# Patient Record
Sex: MALE | Race: BLACK OR AFRICAN AMERICAN | Employment: UNEMPLOYED | URBAN - METROPOLITAN AREA
[De-identification: names, ages, dates, MRNs, and addresses within clinical notes are randomized per-mention and may not be internally consistent; named-entity substitution may affect disease eponyms.]

---

## 2020-12-24 ENCOUNTER — APPOINTMENT (OUTPATIENT)
Dept: GENERAL RADIOLOGY | Age: 25
End: 2020-12-24
Payer: MEDICAID

## 2020-12-24 ENCOUNTER — HOSPITAL ENCOUNTER (EMERGENCY)
Age: 25
Discharge: HOME | End: 2020-12-25
Attending: EMERGENCY MEDICINE
Payer: MEDICAID

## 2020-12-24 VITALS
WEIGHT: 146 LBS | OXYGEN SATURATION: 99 % | RESPIRATION RATE: 21 BRPM | DIASTOLIC BLOOD PRESSURE: 57 MMHG | HEART RATE: 80 BPM | SYSTOLIC BLOOD PRESSURE: 105 MMHG | TEMPERATURE: 99.3 F

## 2020-12-24 LAB
EKG ATRIAL RATE: 73 BPM
EKG P AXIS: 39 DEGREES
EKG P-R INTERVAL: 162 MS
EKG Q-T INTERVAL: 372 MS
EKG QRS DURATION: 88 MS
EKG QTC CALCULATION (BAZETT): 409 MS
EKG R AXIS: 74 DEGREES
EKG T AXIS: 37 DEGREES
EKG VENTRICULAR RATE: 73 BPM

## 2020-12-24 PROCEDURE — 71045 X-RAY EXAM CHEST 1 VIEW: CPT

## 2020-12-24 PROCEDURE — 93005 ELECTROCARDIOGRAM TRACING: CPT

## 2020-12-24 PROCEDURE — 99282 EMERGENCY DEPT VISIT SF MDM: CPT

## 2020-12-24 RX ORDER — NALOXONE HYDROCHLORIDE 4 MG/.1ML
1 SPRAY NASAL PRN
Qty: 1 EACH | Refills: 0 | Status: ON HOLD | OUTPATIENT
Start: 2020-12-24 | End: 2021-01-07 | Stop reason: HOSPADM

## 2020-12-24 ASSESSMENT — ENCOUNTER SYMPTOMS
DIARRHEA: 0
SHORTNESS OF BREATH: 0
NAUSEA: 0
BACK PAIN: 0
ABDOMINAL PAIN: 0
EYE DISCHARGE: 0
CHEST TIGHTNESS: 0
EYE REDNESS: 0
SINUS PAIN: 0
COUGH: 0
COLOR CHANGE: 0
RHINORRHEA: 0
VOMITING: 0

## 2020-12-25 NOTE — ED NOTES
Written and verbal d/c instructions given. Verbalized understanding. 1 script given.  Calling for cab via insurance     [address], [address] [facility]  12/25/20 0151

## 2020-12-25 NOTE — ED PROVIDER NOTES
[facility] ED  EMERGENCY DEPARTMENT ENCOUNTER      Pt Name: Maryjo Brody  MRN: [id number]  [place] 1995  Date of evaluation: 12/24/2020  Provider: Clive Michelle MD    CHIEF COMPLAINT       Chief Complaint   Patient presents with    Drug Overdose         HISTORY OF PRESENT ILLNESS   (Location/Symptom, Timing/Onset, Context/Setting, Quality, Duration, Modifying Factors, Severity)  Note limiting factors. Maryjo Brody is a 30 y.o. male who presents to the emergency department status post OD. Patient was dropped off by friends to the ED. Patient GCS of 15, alert and oriented x3 he is a little sleepy but not falling asleep during my exam.  Patient states that he snorted what he believed was heroin then when he woke up he was here. According to nursing patient was given intranasal Narcan prior to arrival.  Patient denies difficulty breathing. HPI    Nursing Notes were reviewed. REVIEW OF SYSTEMS    (2-9 systems for level 4, 10 or more for level 5)     Review of Systems   Constitutional: Negative for activity change, chills, fatigue and fever. HENT: Negative for congestion, hearing loss, rhinorrhea, sinus pain, sneezing and tinnitus. Eyes: Negative for discharge, redness and visual disturbance. Respiratory: Negative for cough, chest tightness and shortness of breath. Cardiovascular: Negative for chest pain and leg swelling. Gastrointestinal: Negative for abdominal pain, diarrhea, nausea and vomiting. Endocrine: Negative for heat intolerance, polydipsia and polyuria. Genitourinary: Negative for dysuria, flank pain, hematuria and urgency. Musculoskeletal: Negative for back pain, joint swelling and myalgias. Skin: Negative for color change, rash and wound. Allergic/Immunologic: Negative for immunocompromised state. Neurological: Negative for tremors, seizures, syncope, light-headedness and headaches. Hematological: Does not bruise/bleed easily.    Psychiatric/Behavioral: SpO2 Height Weight   124/85 99.3 °F (37.4 °C) Oral 83 10 98 % -- 146 lb (66.2 kg)       Physical Exam  Vitals signs and nursing note reviewed. Constitutional:       Appearance: Normal appearance. He is not ill-appearing, toxic-appearing or diaphoretic. HENT:      Head: Normocephalic. Right Ear: Tympanic membrane and external ear normal.      Left Ear: Tympanic membrane and external ear normal.      Nose: Nose normal.      Mouth/Throat:      Mouth: Mucous membranes are moist.   Eyes:      Extraocular Movements: Extraocular movements intact. Pupils: Pupils are equal, round, and reactive to light. Neck:      Musculoskeletal: Normal range of motion and neck supple. Cardiovascular:      Rate and Rhythm: Normal rate and regular rhythm. Pulses: Normal pulses. Heart sounds: Normal heart sounds. Pulmonary:      Effort: Pulmonary effort is normal.      Breath sounds: Normal breath sounds. Abdominal:      General: Abdomen is flat. Bowel sounds are normal. There is no distension. Palpations: Abdomen is soft. Tenderness: There is no abdominal tenderness. Musculoskeletal: Normal range of motion. General: No swelling, tenderness or signs of injury. Skin:     General: Skin is warm and dry. Capillary Refill: Capillary refill takes less than 2 seconds. Findings: No erythema. Neurological:      General: No focal deficit present. Mental Status: He is alert and oriented to person, place, and time. Psychiatric:         Mood and Affect: Mood normal.         DIAGNOSTIC RESULTS     EKG: All EKG's are interpreted by the Emergency Department Physician who either signs or Co-signs this chart in the absence of a cardiologist.    Normal sinus rhythm no ST segment elevation or depression. There are occasional PACs. Ventricular rate 73 bpm.  No axis deviation. QTC of 409 ms. Otherwise normal EKG.     RADIOLOGY:   Non-plain film images such as CT, Ultrasound and MRI are route as needed for Opioid Reversal, Disp-1 each, R-0Print           Controlled Substances Monitoring:     No flowsheet data found.     (Please note that portions of this note were completed with a voice recognition program.  Efforts were made to edit the dictations but occasionally words are mis-transcribed.)    Analilia Gardner MD (electronically signed)             Sixto Torres PA-C  12/28/20 1535 Vilma Erickson MD  01/12/21 1213

## 2020-12-25 NOTE — ED TRIAGE NOTES
Presented to triage after snorting heroine and overdosing. Friends gave him narcan and dropped him off at the ER. Upon arrival he is drowsy, but answers questions appropriately.

## 2021-01-05 ENCOUNTER — HOSPITAL ENCOUNTER (INPATIENT)
Age: 26
LOS: 1 days | Discharge: HOME | DRG: 751 | End: 2021-01-07
Attending: EMERGENCY MEDICINE | Admitting: PSYCHIATRY & NEUROLOGY
Payer: MEDICAID

## 2021-01-05 DIAGNOSIS — F32.2 CURRENT SEVERE EPISODE OF MAJOR DEPRESSIVE DISORDER WITHOUT PSYCHOTIC FEATURES, UNSPECIFIED WHETHER RECURRENT (HCC): ICD-10-CM

## 2021-01-05 DIAGNOSIS — T14.91XA SUICIDE ATTEMPT (HCC): Primary | ICD-10-CM

## 2021-01-05 DIAGNOSIS — F19.10 DRUG ABUSE (HCC): ICD-10-CM

## 2021-01-05 LAB
ACETAMINOPHEN LEVEL: <5 UG/ML (ref 10–30)
ALBUMIN SERPL-MCNC: 4.6 G/DL (ref 3.5–4.6)
ALP BLD-CCNC: 121 U/L (ref 35–104)
ALT SERPL-CCNC: 25 U/L (ref 0–41)
AMPHETAMINE SCREEN, URINE: NORMAL
ANION GAP SERPL CALCULATED.3IONS-SCNC: 14 MEQ/L (ref 9–15)
AST SERPL-CCNC: 21 U/L (ref 0–40)
BARBITURATE SCREEN URINE: NORMAL
BASOPHILS ABSOLUTE: 0 K/UL (ref 0–0.2)
BASOPHILS RELATIVE PERCENT: 0.4 %
BENZODIAZEPINE SCREEN, URINE: NORMAL
BILIRUB SERPL-MCNC: 0.5 MG/DL (ref 0.2–0.7)
BILIRUBIN URINE: NEGATIVE
BLOOD, URINE: NEGATIVE
BUN BLDV-MCNC: 11 MG/DL (ref 6–20)
CALCIUM SERPL-MCNC: 9.6 MG/DL (ref 8.5–9.9)
CANNABINOID SCREEN URINE: NORMAL
CHLORIDE BLD-SCNC: 104 MEQ/L (ref 95–107)
CHOLESTEROL, TOTAL: 186 MG/DL (ref 0–199)
CLARITY: CLEAR
CO2: 27 MEQ/L (ref 20–31)
COCAINE METABOLITE SCREEN URINE: NORMAL
COLOR: YELLOW
CREAT SERPL-MCNC: 0.81 MG/DL (ref 0.7–1.2)
EKG ATRIAL RATE: 90 BPM
EKG P AXIS: 72 DEGREES
EKG P-R INTERVAL: 146 MS
EKG Q-T INTERVAL: 330 MS
EKG QRS DURATION: 80 MS
EKG QTC CALCULATION (BAZETT): 403 MS
EKG R AXIS: 81 DEGREES
EKG T AXIS: 40 DEGREES
EKG VENTRICULAR RATE: 90 BPM
EOSINOPHILS ABSOLUTE: 0 K/UL (ref 0–0.7)
EOSINOPHILS RELATIVE PERCENT: 0.2 %
ETHANOL PERCENT: NORMAL G/DL
ETHANOL: <10 MG/DL (ref 0–0.08)
GFR AFRICAN AMERICAN: >60
GFR NON-AFRICAN AMERICAN: >60
GLOBULIN: 2.8 G/DL (ref 2.3–3.5)
GLUCOSE BLD-MCNC: 81 MG/DL (ref 70–99)
GLUCOSE URINE: NEGATIVE MG/DL
HCT VFR BLD CALC: 47.9 % (ref 42–52)
HDLC SERPL-MCNC: 62 MG/DL (ref 40–59)
HEMOGLOBIN: 16.2 G/DL (ref 14–18)
KETONES, URINE: ABNORMAL MG/DL
LDL CHOLESTEROL CALCULATED: 110 MG/DL (ref 0–129)
LEUKOCYTE ESTERASE, URINE: NEGATIVE
LYMPHOCYTES ABSOLUTE: 2.7 K/UL (ref 1–4.8)
LYMPHOCYTES RELATIVE PERCENT: 30.7 %
Lab: NORMAL
MCH RBC QN AUTO: 32.6 PG (ref 27–31.3)
MCHC RBC AUTO-ENTMCNC: 33.9 % (ref 33–37)
MCV RBC AUTO: 96.1 FL (ref 80–100)
METHADONE SCREEN, URINE: NORMAL
MONOCYTES ABSOLUTE: 0.2 K/UL (ref 0.2–0.8)
MONOCYTES RELATIVE PERCENT: 2.5 %
NEUTROPHILS ABSOLUTE: 5.8 K/UL (ref 1.4–6.5)
NEUTROPHILS RELATIVE PERCENT: 66.2 %
NITRITE, URINE: NEGATIVE
OPIATE SCREEN URINE: NORMAL
OXYCODONE URINE: NORMAL
PDW BLD-RTO: 15.4 % (ref 11.5–14.5)
PH UA: 5.5 (ref 5–9)
PHENCYCLIDINE SCREEN URINE: NORMAL
PLATELET # BLD: 250 K/UL (ref 130–400)
POTASSIUM REFLEX MAGNESIUM: 3.7 MEQ/L (ref 3.4–4.9)
PROPOXYPHENE SCREEN: NORMAL
PROTEIN UA: NEGATIVE MG/DL
RBC # BLD: 4.98 M/UL (ref 4.7–6.1)
SALICYLATE, SERUM: <0.3 MG/DL (ref 15–30)
SARS-COV-2, NAAT: NOT DETECTED
SODIUM BLD-SCNC: 145 MEQ/L (ref 135–144)
SPECIFIC GRAVITY UA: 1.02 (ref 1–1.03)
TOTAL PROTEIN: 7.4 G/DL (ref 6.3–8)
TRIGL SERPL-MCNC: 70 MG/DL (ref 0–150)
TSH REFLEX: 1.92 UIU/ML (ref 0.44–3.86)
UROBILINOGEN, URINE: 0.2 E.U./DL
WBC # BLD: 8.8 K/UL (ref 4.8–10.8)

## 2021-01-05 PROCEDURE — 80061 LIPID PANEL: CPT

## 2021-01-05 PROCEDURE — 80307 DRUG TEST PRSMV CHEM ANLYZR: CPT

## 2021-01-05 PROCEDURE — G0480 DRUG TEST DEF 1-7 CLASSES: HCPCS

## 2021-01-05 PROCEDURE — 84443 ASSAY THYROID STIM HORMONE: CPT

## 2021-01-05 PROCEDURE — U0002 COVID-19 LAB TEST NON-CDC: HCPCS

## 2021-01-05 PROCEDURE — 93005 ELECTROCARDIOGRAM TRACING: CPT | Performed by: EMERGENCY MEDICINE

## 2021-01-05 PROCEDURE — 99285 EMERGENCY DEPT VISIT HI MDM: CPT

## 2021-01-05 PROCEDURE — 36415 COLL VENOUS BLD VENIPUNCTURE: CPT

## 2021-01-05 PROCEDURE — 81003 URINALYSIS AUTO W/O SCOPE: CPT

## 2021-01-05 PROCEDURE — 80053 COMPREHEN METABOLIC PANEL: CPT

## 2021-01-05 PROCEDURE — 85025 COMPLETE CBC W/AUTO DIFF WBC: CPT

## 2021-01-06 PROBLEM — F32.2 MAJOR DEPRESSIVE DISORDER, SEVERE (HCC): Status: ACTIVE | Noted: 2021-01-06

## 2021-01-06 PROCEDURE — 6370000000 HC RX 637 (ALT 250 FOR IP): Performed by: PSYCHIATRY & NEUROLOGY

## 2021-01-06 PROCEDURE — 99223 1ST HOSP IP/OBS HIGH 75: CPT | Performed by: PSYCHIATRY & NEUROLOGY

## 2021-01-06 PROCEDURE — 1240000000 HC EMOTIONAL WELLNESS R&B

## 2021-01-06 PROCEDURE — G0378 HOSPITAL OBSERVATION PER HR: HCPCS

## 2021-01-06 RX ORDER — MAGNESIUM HYDROXIDE/ALUMINUM HYDROXICE/SIMETHICONE 120; 1200; 1200 MG/30ML; MG/30ML; MG/30ML
30 SUSPENSION ORAL PRN
Status: DISCONTINUED | OUTPATIENT
Start: 2021-01-06 | End: 2021-01-07 | Stop reason: HOSPADM

## 2021-01-06 RX ORDER — HYDROXYZINE PAMOATE 50 MG/1
50 CAPSULE ORAL EVERY 6 HOURS PRN
Status: DISCONTINUED | OUTPATIENT
Start: 2021-01-06 | End: 2021-01-07 | Stop reason: HOSPADM

## 2021-01-06 RX ORDER — ACETAMINOPHEN 325 MG/1
650 TABLET ORAL EVERY 4 HOURS PRN
Status: DISCONTINUED | OUTPATIENT
Start: 2021-01-06 | End: 2021-01-07 | Stop reason: HOSPADM

## 2021-01-06 RX ORDER — HYDROXYZINE HYDROCHLORIDE 50 MG/ML
50 INJECTION, SOLUTION INTRAMUSCULAR EVERY 6 HOURS PRN
Status: DISCONTINUED | OUTPATIENT
Start: 2021-01-06 | End: 2021-01-07 | Stop reason: HOSPADM

## 2021-01-06 RX ORDER — HALOPERIDOL 5 MG
5 TABLET ORAL EVERY 6 HOURS PRN
Status: DISCONTINUED | OUTPATIENT
Start: 2021-01-06 | End: 2021-01-07 | Stop reason: HOSPADM

## 2021-01-06 RX ORDER — BENZTROPINE MESYLATE 1 MG/ML
2 INJECTION INTRAMUSCULAR; INTRAVENOUS 2 TIMES DAILY PRN
Status: DISCONTINUED | OUTPATIENT
Start: 2021-01-06 | End: 2021-01-07 | Stop reason: HOSPADM

## 2021-01-06 RX ORDER — TRAZODONE HYDROCHLORIDE 50 MG/1
50 TABLET ORAL NIGHTLY PRN
Status: DISCONTINUED | OUTPATIENT
Start: 2021-01-06 | End: 2021-01-07 | Stop reason: HOSPADM

## 2021-01-06 RX ORDER — HALOPERIDOL 5 MG/ML
5 INJECTION INTRAMUSCULAR EVERY 6 HOURS PRN
Status: DISCONTINUED | OUTPATIENT
Start: 2021-01-06 | End: 2021-01-07 | Stop reason: HOSPADM

## 2021-01-06 RX ADMIN — HYDROXYZINE PAMOATE 50 MG: 50 CAPSULE ORAL at 21:35

## 2021-01-06 RX ADMIN — TRAZODONE HYDROCHLORIDE 50 MG: 50 TABLET ORAL at 22:47

## 2021-01-06 ASSESSMENT — SLEEP AND FATIGUE QUESTIONNAIRES
DIFFICULTY FALLING ASLEEP: YES
DO YOU USE A SLEEP AID: YES
SLEEP PATTERN: DIFFICULTY FALLING ASLEEP;NIGHTMARES/TERRORS
DO YOU HAVE DIFFICULTY SLEEPING: YES

## 2021-01-06 ASSESSMENT — PATIENT HEALTH QUESTIONNAIRE - PHQ9
SUM OF ALL RESPONSES TO PHQ QUESTIONS 1-9: 21
SUM OF ALL RESPONSES TO PHQ QUESTIONS 1-9: 21

## 2021-01-06 NOTE — H&P
[address] [place] [address] - Department of Psychiatry    History and Physical - Adult     CHIEF COMPLAINT:  Depression SI    History obtained from:  patient    Patient was seen after discussing with the treatment team and reviewing the chart      HISTORY OF PRESENT ILLNESS:      The patient is a 30 y.o. male single, live alone, homeless with significant past history of addiction and depression    Pt was admitted to 1  from [facility]. Was admitted over there a week ago for opiate dependence. Pt was feeling depression and suicidal which he currently minimize    Pt denies any mood swings or anger issues  Pt is focused on going to sober living facility with dual diagnosis approach  Denies any  or     Stressors: The patient is not currently receiving care for the above psychiatric illness. Medications Prior to Admission:   Medications Prior to Admission: naloxone (NARCAN) 4 MG/0.1ML LIQD nasal spray, 1 spray by Nasal route as needed for Opioid Reversal    Compliance:no    Psychiatric Review of Systems       Depression: ues     Sanjuana or Hypomania:  no     Panic Attacks:  no     Phobias:  no     Obsessions and Compulsions:  no     PTSD : no     Hallucinations:  no     Delusions:  no    Substance Abuse History:  ETOH: no   Marijuana: no  Opiates: 3 weeks, not in withdrawal  Other Drugs: no      Past Psychiatric History:  Addiction    Past Medical History:    No past medical history on file. Past Surgical History:    No past surgical history on file. Allergies:   Patient has no known allergies. Family History  No family history on file. Social History:  Born and Raised: [place]  Describes Childhood:   supportive  Education: [employer]  Employment: Unemployed, not seeking work  Relationships: single  Current Support: poor    Legal Hx: none  Access to weapons?:  No      EXAMINATION:    REVIEW OF SYSTEMS:    ROS:  [x] All negative/unchanged except if checked.  Explain positive(checked items) below: [] Constitutional  [] Eyes  [] Ear/Nose/Mouth/Throat  [] Respiratory  [] CV  [] GI  []   [] Musculoskeletal  [] Skin/Breast  [] Neurological  [] Endocrine  [] Heme/Lymph  [] Allergic/Immunologic    Explanation:     Vitals:  /75   Pulse 89   Temp 98.2 °F (36.8 °C) (Oral)   Resp 16   Ht 5' 6\" (1.676 m)   Wt 150 lb (68 kg)   SpO2 97%   BMI 24.21 kg/m²      Neurologic Exam:   Muscle Strength & Tone: full ROM  Gait: normal gait   Involuntary Movements: Yes    Mental Status Examination:    Level of consciousness:  within normal limits   Appearance:  ill-appearing  Behavior/Motor:  no abnormalities noted  Attitude toward examiner:  cooperative  Speech:  normal rate   Mood: anxious  Affect:  blunted  Thought processes:  linear   Thought content:  Suicidal Ideation:  denies suicidal ideation  Cognition:  oriented to person, place, and time   Concentration intact  Memory intact  Insight fair   Judgement poor   Fund of Knowledge adequate    Mini Mental Status 30/30      DIAGNOSIS:     Opiate dependence   SIMD    RISK ASSESSMENT:    SUICIDE RISK ASSESSMENT: moderate  HOMICIDE: low  AGITATION/VIOLENCE: low  ELOPEMENT: low    LABS: REVIEWED TODAY:  Recent Labs     01/05/21 2113   WBC 8.8   HGB 16.2        Recent Labs     01/05/21 2113   *   K 3.7      CO2 27   BUN 11   CREATININE 0.81   GLUCOSE 81     Recent Labs     01/05/21 2113   BILITOT 0.5   ALKPHOS 121*   AST 21   ALT 25     Lab Results   Component Value Date    LABAMPH Neg 01/05/2021    BARBSCNU Neg 01/05/2021    LABBENZ Neg 01/05/2021    LABMETH Neg 01/05/2021    OPIATESCREENURINE Neg 01/05/2021    PHENCYCLIDINESCREENURINE Neg 01/05/2021    ETOH <10 01/05/2021     No results found for: TSH, FREET4  No results found for: LITHIUM  No results found for: VALPROATE, CBMZ  No results found for: LITHIUM, VALPROATE    FURTHER LABS ORDERED :      Radiology   Xr Chest Portable    Result Date: 12/25/2020
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
## 2021-01-06 NOTE — ED NOTES
Pt is resting peacefully in bed, alert to voice. No distress noted.        Elian Allen RN  01/06/21 6548

## 2021-01-06 NOTE — GROUP NOTE
Group Therapy Note    Date: 1/6/2021    Group Start Time: 8950  Group End Time: 1700  Group Topic: Healthy Living/Wellness    [facility]    [facility]        Group Therapy Note    Attendees: 12/17         Patient's Goal:  To learn about healthy coping skills. Notes:  Patient participated in group discussion.     Status After Intervention:  Improved    Participation Level: Interactive    Participation Quality: Appropriate and Attentive      Speech:  normal      Thought Process/Content: Logical      Affective Functioning: Flat      Mood: euthymic      Level of consciousness:  Alert and Attentive      Response to Learning: Able to verbalize current knowledge/experience      Endings: None Reported    Modes of Intervention: Education      Discipline Responsible: [address], [employer]      Signature:  Aster Warren

## 2021-01-06 NOTE — ED NOTES
Phone call placed to Dr. Nanette Ness to discuss pt disposition.  Order received to admit to 430Romeo Noble RN  01/06/21 8610

## 2021-01-06 NOTE — CARE COORDINATION
1/6/2021 @ 1337 - Patient was approached regarding completing the Leisure Assessment. When asked about leisure activities, patient stated he liks to reading and write songs. Patient stated he has limited support from family due to his drug addiction. He stated he has been abusing drugs since he was 13years old. Patient is now 30. He is aware that he needs to gain sobriety for himself and his family. Patient was inpatient at [facility] but left abruptly and impulsively. He presented remorseful as about his recent actions and wants to figure out next steps while he is on [address]. Patient seemed genuine in his efforts to make life better for himself and his children.      Electronically signed by CONNER Smith on 1/6/2021 at 2:02 PM

## 2021-01-06 NOTE — ED TRIAGE NOTES
Patient to ED with c/o suicidal thoughts  Patient states he over dosed this morning and was brought here to [facility].    Patient states he left AMA  Patient states he's been having suicidal thoughts \"for awhile\"  Patient states he has no plan  Patent calm and cooperative

## 2021-01-06 NOTE — ED NOTES
Pt is resting in bed, no distress is noted. Suicide precautions maintained.       Roberta Tanner, HAILEY  01/06/21 5506

## 2021-01-06 NOTE — ED NOTES
Report given to [facility] on [address] [place] [address]  01/06/21 4617

## 2021-01-06 NOTE — ED PROVIDER NOTES
eMERGENCY dEPARTMENT eNCOUnter      [facility]    Chief Complaint   Patient presents with    Suicidal       HPI    Maria Cabezas is a 30 y.o. male who presentsto ED from home  By private car  With complaint of Suicide attempt  Onset just PTA  Patient left AMA from [address] [place], [place] [address]ab and called his friend to get some heroin fentanyl in order to kill himself. Patient had to be given Narcan by his friend because he stopped breathing. Patient has been depressed and no one in the family is talking to him. Patient's girlfriend broke up with him today and he decided to kill himself. PAST MEDICAL HISTORY    No past medical history on file. SURGICAL HISTORY    No past surgical history on file. CURRENT MEDICATIONS    Current Outpatient Rx   Medication Sig Dispense Refill    naloxone (NARCAN) 4 MG/0.1ML LIQD nasal spray 1 spray by Nasal route as needed for Opioid Reversal 1 each 0       ALLERGIES    No Known Allergies    FAMILY HISTORY    No family history on file. SOCIAL HISTORY    Social History     Socioeconomic History    Marital status: Single     Spouse name: Not on file    Number of children: Not on file    Years of education: Not on file    Highest education level: Not on file   Occupational History    Not on file   Social Needs    Financial resource strain: Not on file    Food insecurity     Worry: Not on file     Inability: Not on file    Transportation needs     Medical: Not on file     Non-medical: Not on file   Tobacco Use    Smoking status: Current Every Day Smoker     Packs/day: 1.00   Substance and Sexual Activity    Alcohol use:  Yes    Drug use: Yes     Types: Marijuana     Comment: heroine, pcp    Sexual activity: Not on file   Lifestyle    Physical activity     Days per week: Not on file     Minutes per session: Not on file    Stress: Not on file   Relationships    Social connections     Talks on phone: Not on file     Gets together: Not on file Attends Voodoo service: Not on file     Active member of club or organization: Not on file     Attends meetings of clubs or organizations: Not on file     Relationship status: Not on file    Intimate partner violence     Fear of current or ex partner: Not on file     Emotionally abused: Not on file     Physically abused: Not on file     Forced sexual activity: Not on file   Other Topics Concern    Not on file   Social History Narrative    Not on file       REVIEW OF SYSTEMS    Constitutional:  Denies fever, chills, weight loss or weakness   Eyes:  Denies photophobia or discharge   HENT:  Denies sore throat or ear pain   Respiratory:  Denies cough or shortness of breath   Cardiovascular:  Denies chest pain, palpitations or swelling   GI:  Denies abdominal pain, nausea, vomiting, or diarrhea   Musculoskeletal:  Denies back pain   Skin:  Denies rash   Neurologic:  Denies headache, focal weakness or sensory changes   Endocrine:  Denies polyuria or polydypsia   Lymphatic:  Denies swollen glands   Psychiatric: Complains of depression, and suicidal ideation but denies homicidal ideation   All systems negative except as marked. PHYSICAL EXAM    VITAL SIGNS: /79   Pulse 99   Temp 98.2 °F (36.8 °C) (Oral)   Resp 18   Ht 5' 6\" (1.676 m)   Wt 150 lb (68 kg)   SpO2 99%   BMI 24.21 kg/m²    Constitutional:  Well developed, Well nourished, No acute distress, Non-toxic appearance. HENT:  Normocephalic, Atraumatic, Bilateral external ears normal, Oropharynx moist, No oral exudates, Nose normal. Neck- Normal range of motion, No tenderness, Supple, No stridor. Eyes:  PERRL, EOMI, Conjunctiva normal, No discharge. Respiratory:  Normal breath sounds, No respiratory distress, No wheezing, No chest tenderness. Cardiovascular:  Normal heart rate, Normal rhythm, No murmurs, No rubs, No gallops. GI:  Bowel sounds normal, Soft, No tenderness, No masses, No pulsatile masses. :  No CVA tenderness. Musculoskeletal:  Intact distal pulses, No edema, No tenderness, No cyanosis, No clubbing. Good range of motion in all major joints. No tenderness to palpation or major deformities noted. Back- No tenderness. Integument:  Warm, Dry, No erythema, No rash. Lymphatic:  No lymphadenopathy noted. Neurologic:  Alert & oriented x 3, Normal motor function, Normal sensory function, No focal deficits noted. Psychiatric:  Affect depressed, Judgment poor, Mood normal.     EKG    NSR, HR 90 , Normal Axis, No ST  changes, QTc 403, tall T waves    RADIOLOGY    No orders to display       REEVALUATION   Patient was updated the results of labs. Patient is medically cleared for placement    Labs  Labs Reviewed   COMPREHENSIVE METABOLIC PANEL W/ REFLEX TO MG FOR LOW K - Abnormal; Notable for the following components:       Result Value    Sodium 145 (*)     Alkaline Phosphatase 121 (*)     All other components within normal limits   URINALYSIS - Abnormal; Notable for the following components:    Ketones, Urine TRACE (*)     All other components within normal limits   CBC WITH AUTO DIFFERENTIAL - Abnormal; Notable for the following components:    MCH 32.6 (*)     RDW 15.4 (*)     All other components within normal limits   LIPID PANEL - Abnormal; Notable for the following components:    HDL 62 (*)     All other components within normal limits   ETHANOL   URINE DRUG SCREEN   COVID-19   ACETAMINOPHEN LEVEL   SALICYLATE LEVEL   TSH WITH REFLEX             Summation      Patient Course:     ED Medications administered this visit:  Medications - No data to display    New Prescriptions from this visit:    New Prescriptions    No medications on file       Follow-up:  No follow-up provider specified. Final Impression:   1. Suicide attempt ([facility] [address]    2.  Drug abuse ([facility] [address] (Please note that portions of this note were completed with a voice recognition program.  Efforts were made to edit the dictations but occasionally words are mis-transcribed.)          Sunny Guevara MD  01/06/21 0522

## 2021-01-06 NOTE — SUICIDE SAFETY PLAN
SAFETY PLAN    A suicide Safety Plan is a document that supports someone when they are having thoughts of suicide. Warning Signs that indicate a suicidal crisis may be developing: What (situations, thoughts, feelings, body sensations, behaviors, etc.) do you experience that lets you know you are beginning to think about suicide? 1. Feeling suicidal  2. Severe depression  3. Negative thoughts    Internal Coping Strategies:  What things can I do (relaxation techniques, hobbies, physical activities, etc.) to take my mind off my problems without contacting another person? 1. Reading   2. Writing  3. Music    People and social settings that provide distraction: Who can I call or where can I go to distract me? 1. Name: Cyndra Seip.  Phone: unsure  2. Name: Elton Maya  Phone: unsure   3. Place: Visiting with family            4. Place: Spending time with his children    People whom I can ask for help: Who can I call when I need help - for example, friends, family, clergy, someone else? 1. Name: Cyndra Seip.               Phone: unsure  2. Name: Elton Maya  Phone: unsure    Professionals or [facility] agencies I can contact during a crisis: Who can I call for help - for example, my doctor, my psychiatrist, my psychologist, a mental health provider, a suicide hotline? 1. Clinician Name: None. Phone: None. Clinician Pager or Emergency Contact #: None. 2. Suicide Prevention Lifeline: 0-400-921-TALK (3086)    3. [address] [place] [address] Emergency Services -  for example, [facility] suicide hotline, [facility]: 734      Emergency Services Address: [address] [place] [address].      Emergency Services Phone: [phone]    Making the environment safe: How can I make my environment (house/apartment/living space) safer? For example, can I remove guns, medications, and other items? 1. Patient is homeless and would feel safe having a place to live. 2. Patient stated he would also feel safe in a treatment program for drug addiction recovery.

## 2021-01-06 NOTE — CARE COORDINATION
Brief Intervention and Referral to Treatment Summary    Patient was provided PHQ-9, AUDIT and DAST Screening:      PHQ-9 Score: 21  AUDIT Score: 0  DAST Score: 10    Patients substance use is considered     Low Risk/Healthy  Moderate Risk  Harmful  Dependent x    Patients depression is considered:     Minimal  Mild   Moderate  Moderately Severe  Severe x    Brief Education Was Provided    Patient was receptive x  Patient was not receptive      Brief Intervention Is Provided (Only for AUDIT or DAST)     Patient reports readiness to decrease and/or stop use and a plan was discussed x  Patient denies readiness to decrease and/or stop use and a plan was not discussed      Recommendations/Referrals for Brief and/or Specialized Treatment Provided to Patient   Patient stated he was recently inpatient at [facility]. He left abruptly and can return in 7 days. Patient is willing to remain on [address] until he can return to inpatient treatment.      Electronically signed by Marita Guerrero on 1/6/2021 at 2:07 PM

## 2021-01-06 NOTE — PROGRESS NOTES
Arrived on unit via wheelchair at [facility]. Pt declined tour of unit,tour of room given. This writer and Kian Roblero rn completed skin and contraband check and both negative. Pt very sleepy,declined to complete admission process. Pt denies current SI,HI,hallucinations. Contracts for safety on the unit. Unit paperwork given with hipaa code and Pt Rights with explanation.

## 2021-01-06 NOTE — GROUP NOTE
Group Therapy Note    Date: 1/6/2021    Group Start Time: 0900  Group End Time: 0920  Group Topic: Community Meeting    CONNER Alvarez        Group Therapy Note    Attendees: 10         Patient's Goal:  To participate in morning meeting. Notes:  Patient participated in discussion and exercise. Status After Intervention:  Unchanged    Participation Level: Active Listener    Participation Quality: Appropriate      Speech:  normal      Thought Process/Content: Logical      Affective Functioning: Congruent      Mood: anxious      Level of consciousness:  Alert      Response to Learning: Able to verbalize current knowledge/experience      Endings: None Reported    Modes of Intervention: Education      Discipline Responsible: /Counselor      Signature:   CONNER Banda
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
## 2021-01-06 NOTE — CARE COORDINATION
BHI Biopsychosocial Assessment    Current Level of Psychosocial Functioning     Independent   Dependent x  Minimal Assist     Comments:  Patient is currently homeless. He is employed and used to own a home. Patient sold his home and used most of the proceeds to purchase illegal drugs. Patient recently  from his partner and his child are wards of the [facility]. Psychosocial High Risk Factors (check all that apply)    Unable to obtain meds   Chronic illness/pain    Substance abuse x  Lack of Family Support x  Financial stress x  Isolation x  Inadequate Community Resources x  Suicide attempt(s) x  Not taking medications x  Victim of crime   Developmental Delay  Unable to manage personal needs    Age 72 or older   Homeless x  No transportation   Readmission within 30 days  Unemployment   Traumatic Event    Comments:  Patient has at least eight high risk factors associated with this admission. Psychiatric Advanced Directives: None Reported. Family to Involve in Treatment: Patient stated he has no one for staff to contact on his behalf. Sexual Orientation:   Patient is in neither a hetero or homosexual relationship at this time. Patient Strengths: Patient is motivated to overcome his drug addiction. Patient Barriers: None Identified. Opiate Education Provided:  Patient completed brief education and intervention as indicated by his SBIRT Score. CMHC/mental health history:    Plan of Care   medication management, group/individual therapies, family meetings, psycho -education, treatment team meetings to assist with stabilization    Initial Discharge Plan:  Patient has plans to return to [facility] in 7 days.        Clinical Summary: Patient is a 30year old male who was admitted to the [facility] due to making a suicide attempt. Reportedly, patient ingested drugs during the attempt and was given Narcan by a friend. When interviewed, patient presented very remorseful about his actions. He feels he let his children and family down. Patient wants to get clean and sober to make amends for all the struggles he has put his family through. Patient presented as honest and genuine about his effort to get his addiction and mental health under control. Patient stated he has not had a mental health intervention prior to his time at [facility] and coming here to [facility]. By the end of interview, patient presented as upbeat and indicated he is very committed to getting the help he needs.      Electronically signed by CONNER Farrar on 1/6/2021 at 2:19 PM

## 2021-01-06 NOTE — CONSULTS
[facility] MEDICINE    HISTORY AND PHYSICAL EXAM    PATIENT NAME:  Jonas Oreilly    MRN:  [id number]  SERVICE DATE:  1/6/2021   SERVICE TIME:  9:10 AM    Primary Care Physician: No primary care provider on file. SUBJECTIVE  CHIEF COMPLAINT:  Medically appropriate for inpatient psychiatry admission. Consult for medical H/P encounter. HPI:  This is a 30 y.o. male who presents with PMHx substance abuse (heroin and fentanyl), drug overdose presented to emergency room with  Planned suicide attempt after girlfriend broke up with him. He called his friend to get home some heroin fentanyl. He stopped breathing and was given narcan. He left rehab AMA. He was brought to ED by private vehicle. Patient cleared from emergency room for psychiatric care. Patient Seen, Chart, Labs, Radiologystudies, and Consults reviewed. Patient denies headache, chest pain, shortness of breath, N/V/D/C, fever/chills. PAST MEDICAL HISTORY:  No past medical history on file. PAST SURGICAL HISTORY:  No past surgical history on file. FAMILY HISTORY:  No family history on file. SOCIAL HISTORY:    Social History     Socioeconomic History    Marital status: Single     Spouse name: Not on file    Number of children: Not on file    Years of education: Not on file    Highest education level: Not on file   Occupational History    Not on file   Social Needs    Financial resource strain: Not on file    Food insecurity     Worry: Not on file     Inability: Not on file    Transportation needs     Medical: Not on file     Non-medical: Not on file   Tobacco Use    Smoking status: Current Every Day Smoker     Packs/day: 1.00   Substance and Sexual Activity    Alcohol use:  Yes    Drug use: Yes     Types: Marijuana     Comment: michaela, pcp    Sexual activity: Not on file   Lifestyle    Physical activity     Days per week: Not on file     Minutes per session: Not on file    Stress: Not on file   Relationships  Social connections     Talks on phone: Not on file     Gets together: Not on file     Attends Yazidism service: Not on file     Active member of club or organization: Not on file     Attends meetings of clubs or organizations: Not on file     Relationship status: Not on file    Intimate partner violence     Fear of current or ex partner: Not on file     Emotionally abused: Not on file     Physically abused: Not on file     Forced sexual activity: Not on file   Other Topics Concern    Not on file   Social History Narrative    Not on file     MEDICATIONS:    Current Facility-Administered Medications   Medication Dose Route Frequency Provider Last Rate Last Admin    acetaminophen (TYLENOL) tablet 650 mg  650 mg Oral Q4H PRN Elsy Harris MD        magnesium hydroxide (MILK OF MAGNESIA) 400 MG/5ML suspension 30 mL  30 mL Oral Daily PRN Elsy Harris MD        aluminum & magnesium hydroxide-simethicone (MAALOX) 200-200-20 MG/5ML suspension 30 mL  30 mL Oral PRN Elsy Harris MD        haloperidol lactate (HALDOL) injection 5 mg  5 mg Intramuscular Q6H PRN Elsy Harris MD        Or    haloperidol (HALDOL) tablet 5 mg  5 mg Oral Q6H PRN Elsy Harris MD        traZODone (DESYREL) tablet 50 mg  50 mg Oral Nightly PRN Elsy Harris MD        benztropine mesylate (COGENTIN) injection 2 mg  2 mg Intramuscular BID PRN Elsy Harris MD        hydrOXYzine (VISTARIL) capsule 50 mg  50 mg Oral Q6H PRN Elsy Harris MD        Or    hydrOXYzine (VISTARIL) injection 50 mg  50 mg Intramuscular Q6H PRN Elsy Harris MD           ALLERGIES: Patient has no known allergies. REVIEW OF SYSTEM:   ROS as noted in HPI, 12 point ROS reviewed and otherwise negative.     OBJECTIVE  PHYSICAL EXAM: /75   Pulse 89   Temp 98.2 °F (36.8 °C) (Oral)   Resp 16   Ht 5' 6\" (1.676 m)   Wt 150 lb (68 kg)   SpO2 97%   BMI 24.21 kg/m² This is only a history and physical examination and not medical management. The patient is to contact and follow up with their primary care physician and go over any abnormal labs, imaging, findings, medical concerns, or conditions that we have and have not addressed during this encounter.     Plan of care discussed with: patient    SIGNATURE: ZEFERINO Ye NP  DATE: January 6, 2021  TIME: 9:10 AM
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
## 2021-01-07 VITALS
HEART RATE: 93 BPM | DIASTOLIC BLOOD PRESSURE: 64 MMHG | RESPIRATION RATE: 18 BRPM | SYSTOLIC BLOOD PRESSURE: 101 MMHG | OXYGEN SATURATION: 100 % | BODY MASS INDEX: 24.11 KG/M2 | TEMPERATURE: 97 F | WEIGHT: 150 LBS | HEIGHT: 66 IN

## 2021-01-07 PROBLEM — F11.20 UNCOMPLICATED OPIOID DEPENDENCE (HCC): Status: ACTIVE | Noted: 2021-01-07

## 2021-01-07 PROBLEM — F19.94 SUBSTANCE INDUCED MOOD DISORDER (HCC): Status: ACTIVE | Noted: 2021-01-07

## 2021-01-07 PROCEDURE — 99239 HOSP IP/OBS DSCHRG MGMT >30: CPT | Performed by: PSYCHIATRY & NEUROLOGY

## 2021-01-07 PROCEDURE — 93010 ELECTROCARDIOGRAM REPORT: CPT | Performed by: INTERNAL MEDICINE

## 2021-01-07 PROCEDURE — G0378 HOSPITAL OBSERVATION PER HR: HCPCS

## 2021-01-07 NOTE — DISCHARGE INSTR - DIET
? Good nutrition is important when healing from an illness, injury, or surgery. Follow any nutrition recommendations given to you during your hospital stay. ? If you were given an oral nutrition supplement while in the hospital, continue to take this supplement at home. You can take it with meals, in-between meals, and/or before bedtime. These supplements can be purchased at most local grocery stores, pharmacies, and chain [employer]-stores. ? If you have any questions about your diet or nutrition, call the hospital and ask for the dietitian.   As tolerated

## 2021-01-07 NOTE — DISCHARGE SUMMARY
DISCHARGE SUMMARY      Patient ID:  Michelle Woods  [id number]  54 y.o.  1995      Admit date: 1/5/2021    Discharge date and time: 1/7/2021    Admitting Physician: Kishore Cameron MD     Discharge Physician: Dr Renae Krishna MD    Admission Diagnoses: Major depressive disorder, severe ([facility] [address] [F32.2]    Admission Condition: poor    Discharged Condition: stable    Admission Circumstance: The patient is a 30 y.o. male single, live alone, homeless with significant past history of addiction and depression     Pt was admitted to [facility] from [facility]. Was admitted over there a week ago for opiate dependence. Pt was feeling depression and suicidal which he currently minimize     Pt denies any mood swings or anger issues  Pt is focused on going to sober living facility with dual diagnosis approach  Denies any AH or VH      PAST MEDICAL/PSYCHIATRIC HISTORY:   No past medical history on file. FAMILY/SOCIAL HISTORY:  No family history on file. Social History     Socioeconomic History    Marital status: Single     Spouse name: Not on file    Number of children: Not on file    Years of education: Not on file    Highest education level: Not on file   Occupational History    Not on file   Social Needs    Financial resource strain: Not on file    Food insecurity     Worry: Not on file     Inability: Not on file    Transportation needs     Medical: Not on file     Non-medical: Not on file   Tobacco Use    Smoking status: Current Every Day Smoker     Packs/day: 1.00   Substance and Sexual Activity    Alcohol use:  Yes    Drug use: Yes     Types: Marijuana     Comment: heroine, pcp    Sexual activity: Not on file   Lifestyle    Physical activity     Days per week: Not on file     Minutes per session: Not on file    Stress: Not on file   Relationships    Social connections     Talks on phone: Not on file     Gets together: Not on file     Attends Mu-ism service: Not on file Active member of club or organization: Not on file     Attends meetings of clubs or organizations: Not on file     Relationship status: Not on file    Intimate partner violence     Fear of current or ex partner: Not on file     Emotionally abused: Not on file     Physically abused: Not on file     Forced sexual activity: Not on file   Other Topics Concern    Not on file   Social History Narrative    Not on file       MEDICATIONS:    Current Facility-Administered Medications:     acetaminophen (TYLENOL) tablet 650 mg, 650 mg, Oral, Q4H PRN, Harshil Brooke MD    magnesium hydroxide (MILK OF MAGNESIA) 400 MG/5ML suspension 30 mL, 30 mL, Oral, Daily PRN, Harshil Brooke MD    aluminum & magnesium hydroxide-simethicone (MAALOX) 200-200-20 MG/5ML suspension 30 mL, 30 mL, Oral, PRN, Harshil Brooke MD    haloperidol lactate (HALDOL) injection 5 mg, 5 mg, Intramuscular, Q6H PRN **OR** haloperidol (HALDOL) tablet 5 mg, 5 mg, Oral, Q6H PRN, Harshil Brooke MD    traZODone (DESYREL) tablet 50 mg, 50 mg, Oral, Nightly PRN, Harshil Brooke MD, 50 mg at 01/06/21 2247    benztropine mesylate (COGENTIN) injection 2 mg, 2 mg, Intramuscular, BID PRN, Harshil Brooke MD    hydrOXYzine (VISTARIL) capsule 50 mg, 50 mg, Oral, Q6H PRN, 50 mg at 01/06/21 2135 **OR** hydrOXYzine (VISTARIL) injection 50 mg, 50 mg, Intramuscular, Q6H PRN, Harshil Brooke MD    Examination:  /64   Pulse 93   Temp 97 °F (36.1 °C) (Oral)   Resp 18   Ht 5' 6\" (1.676 m)   Wt 150 lb (68 kg)   SpO2 100%   BMI 24.21 kg/m²   Gait - steady    HOSPITAL COURSE[de-identified]  Following admission to the hospital, patient had a complete physical exam and blood work up  Patient was monitored closely with suicide precaution  Patient was not started on any medication  Was encouraged to participate in group and other milieu activity  Patient started to feel better with this combination of treatment. Significant progress in the symptoms since admission. Mood better, with the score of 2/10 - bad  No AVH or paranoid thoughts  No Hopeless or worthless feeling  No active SI/HI  Appetite:  [x] Normal  [] Increased  [] Decreased    Sleep:       [x] Normal  [] Fair       [] Poor            Energy:    [x] Normal  [] Increased  [] Decreased     SI [] Present  [x] Absent  HI  []Present  [x] Absent   Aggression:  [] yes  [] no  Patient is [x] able  [] unable to CONTRACT FOR SAFETY   Medication side effects(SE):  [x] None(Psych. Meds.) [] Other      Mental Status Examination on discharge:    Level of consciousness:  within normal limits   Appearance:  well-appearing  Behavior/Motor:  no abnormalities noted  Attitude toward examiner:  attentive and good eye contact  Speech:  spontaneous, normal rate and normal volume   Mood: euthymic  Affect:  mood congruent  Thought processes:  linear   Thought content:  Suicidal Ideation:  denies suicidal ideation  Delusions:  no evidence of delusions  Perceptual Disturbance:  denies any perceptual disturbance  Cognition:  oriented to person, place, and time   Concentration intact  Memory intact  Insight good   Judgement fair   Fund of Knowledge adequate      ASSESSMENT:  Patient symptoms are:  [x] Well controlled  [x] Improving  [] Worsening  [] No change      Diagnosis:  Principal Problem:    Substance induced mood disorder ([facility] [address]  Active Problems:    Uncomplicated opioid dependence ([facility] [address]  Resolved Problems:    * No resolved hospital problems.  *      LABS:    Recent Labs     01/05/21 2113   WBC 8.8   HGB 16.2        Recent Labs     01/05/21 2113   *   K 3.7      CO2 27   BUN 11   CREATININE 0.81   GLUCOSE 81     Recent Labs     01/05/21 2113   BILITOT 0.5   ALKPHOS 121*   AST 21   ALT 25     Lab Results   Component Value Date    LABAMPH Neg 01/05/2021    BARBSCNU Neg 01/05/2021    LABBENZ Neg 01/05/2021    LABMETH Neg 01/05/2021 OPIATESCREENURINE Neg 01/05/2021    PHENCYCLIDINESCREENURINE Neg 01/05/2021    ETOH <10 01/05/2021     No results found for: TSH, FREET4  No results found for: LITHIUM  No results found for: VALPROATE, CBMZ    RISK ASSESSMENT AT DISCHARGE: Low risk for suicide and homicide. Treatment Plan:  Reviewed current Medications with the patient. Education provided on the complaince with treatment. Risks, benefits, side effects, drug-to-drug interactions and alternatives to treatment were discussed. Encourage patient to attend outpatient follow up appointment and therapy. Patient was advised to call the outpatient provider, visit the nearest ED or call 911 if symptoms are not manageable. Patient's family member was contacted prior to the discharge.          Medication List      STOP taking these medications    Narcan 4 MG/0.1ML Liqd nasal spray  Generic drug: naloxone              Reason for more than one antipsychotic:   [x] N/A  [] 3 failed monotherapy(drugs tried):  [] Cross over to a new antipsychotic  [] Taper to monotherapy from polypharmacy  [] Augmentation of Clozapine therapy due to treatment resistance to single therapy        TIME SPEND - 35 MINUTES TO COMPLETE THE EVALUATION, DISCHARGE SUMMARY, MEDICATION RECONCILIATION AND FOLLOW UP CARE     SignedBrettair Plush  1/7/2021  9:46 AM

## 2021-01-07 NOTE — GROUP NOTE
Group Therapy Note    Date: 1/6/2021    Group Start Time: 3361  Group End Time: 1900  Group Topic: Recreational    [facility]    Zay Velazquez        Group Therapy Note    Attendees: 13/17         Patient's Goal:  To play game of Heads Up with the group. Notes:  Patient played the game with peers.     Status After Intervention:  Improved    Participation Level: Interactive    Participation Quality: Appropriate and Attentive      Speech:  normal      Thought Process/Content: Logical      Affective Functioning: Flat      Mood: euthymic      Level of consciousness:  Alert and Attentive      Response to Learning: Progressing to goal      Endings: None Reported    Modes of Intervention: Activity      Discipline Responsible: Tribzi      Signature:  Zay Velazquez

## 2021-01-07 NOTE — PROGRESS NOTES
Pt noted up on the unit talking on the phone this am, pt appears sleepy, stated he is ok. Pt denies all.

## 2021-01-07 NOTE — GROUP NOTE
Group Therapy Note    Date: 1/7/2021    Group Start Time: 0120  Group End Time: 0210  Group Topic: Cognitive Skills    [facility]    MARVIN Sun        Group Therapy Note    Attendees: 9         Patient's Goal:  Identify triggers for anger    Notes:  Often needed redirected     Status After Intervention:  Unchanged    Participation Level:  Active Listener    Participation Quality: Attentive      Speech:  normal      Thought Process/Content: Logical      Affective Functioning: Flat      Mood: depressed      Level of consciousness:  Alert      Response to Learning: Progressing to goal      Endings: None Reported    Modes of Intervention: Education      Discipline Responsible: /Counselor      Signature:  MARVIN Sun

## 2021-01-07 NOTE — PROGRESS NOTES
Pt. attended the 0900 community meeting. Electronically signed by David Ruiz [address] on 1/7/2021 at 9:47 AM

## 2021-01-07 NOTE — GROUP NOTE
Group Therapy Note    Date: 1/7/2021    Group Start Time: 1000  Group End Time: 1050  Group Topic: Psychoeducation    [facility]    Merle Rothman        Group Therapy Note    Attendees: 7         Patient's Goal:  \"To attend the groups\"    Notes:  Patient was calmer, focus fairly and work independently on his task in group. Status After Intervention:  Unchanged    Participation Level:  Active Listener    Participation Quality: Appropriate      Speech:  quiet      Thought Process/Content: Linear      Affective Functioning: Congruent      Mood: calm      Level of consciousness:  Alert      Response to Learning: Progressing to goal      Endings: None Reported    Modes of Intervention: Education, Socialization and Activity      Discipline Responsible: Psychoeducational Specialist      Signature:  Merle Rothman

## 2021-01-07 NOTE — PROGRESS NOTES
Pt left unit with staff, escorted to lobby and collected by family for ride home. Belongings given to pt. Pt denies any current suicidal ideation, homicidal ideation or hallucinations. Mood and affect stable.

## 2021-01-07 NOTE — GROUP NOTE
Group Therapy Note    Date: 1/7/2021    Group Start Time: 1110  Group End Time: 3625  Group Topic: Psychotherapy    [facility] BRIAN Delarosa [employer]        Group Therapy Note    Attendees: 7         Patient's Goal:  To be discharged today    Notes:  Patient is working on a ride to pick him up    Status After Intervention:  Improved    Participation Level: Interactive    Participation Quality: Appropriate      Speech:  normal      Thought Process/Content: Logical      Affective Functioning: Congruent      Mood: anxious      Level of consciousness:  Alert      Response to Learning: Progressing to goal      Endings: None Reported    Modes of Intervention: Support      Discipline Responsible: /Counselor      Signature:  Iman Delarosa, [employer]

## 2021-01-07 NOTE — PROGRESS NOTES
Discharge instructions reviewed verbally and in writing including f/u appointments. Patient verbalizes understanding and signed as such. All belongings returned for discharge. Patient denies SI, HI, A/V hallucinations, mood is stable. Pt verbalized he has a ride to home he will call them and give an estimated time of DC.
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
## 2021-01-11 NOTE — GROUP NOTE
Group Therapy Note    Date: 1/6/2021    Group Start Time: 0852  Group End Time: 1450  Group Topic: Cognitive Skills    [facility] JOAQUIN Delarosa, [facility]        Group Therapy Note    Attendees: 11         Patient's Goal:  Learn coping skills    Notes:  Patient participated    Status After Intervention:  Improved    Participation Level: Interactive    Participation Quality: Appropriate      Speech:  normal      Thought Process/Content: Logical      Affective Functioning: Congruent      Mood: anxious      Level of consciousness:  Alert      Response to Learning: Progressing to goal      Endings: None Reported    Modes of Intervention: Support      Discipline Responsible: /Counselor      Signature:  Iman Delarosa, [facility]

## 2021-01-12 NOTE — GROUP NOTE
Group Therapy Note    Date: 1/6/2021    Group Start Time: 1105  Group End Time: 1920  Group Topic: Psychotherapy    [facility] JOAQUIN Delarosa, [facility]        Group Therapy Note    Attendees: 7         Patient's Goal:  To leave here and go to outpt    Notes:  Patient wants to go to [place]    Status After Intervention:  Improved    Participation Level: Interactive    Participation Quality: Appropriate      Speech:  normal      Thought Process/Content: Logical      Affective Functioning: Congruent      Mood: anxious      Level of consciousness:  Alert      Response to Learning: Progressing to goal      Endings: None Reported    Modes of Intervention: Support      Discipline Responsible: /Counselor      Signature:  Iman Delarosa, [facility]